# Patient Record
Sex: MALE | Race: WHITE | Employment: OTHER | URBAN - METROPOLITAN AREA
[De-identification: names, ages, dates, MRNs, and addresses within clinical notes are randomized per-mention and may not be internally consistent; named-entity substitution may affect disease eponyms.]

---

## 2017-05-15 ENCOUNTER — HOSPITAL ENCOUNTER (OUTPATIENT)
Dept: GENERAL RADIOLOGY | Age: 72
Discharge: HOME | End: 2017-05-15
Payer: MEDICARE

## 2017-05-15 DIAGNOSIS — M25.531 RIGHT WRIST PAIN: ICD-10-CM

## 2017-05-15 PROCEDURE — 73110 X-RAY EXAM OF WRIST: CPT

## 2017-08-14 PROBLEM — G43.009 MIGRAINE WITHOUT AURA AND WITHOUT STATUS MIGRAINOSUS, NOT INTRACTABLE: Status: ACTIVE | Noted: 2017-08-14

## 2017-08-14 PROBLEM — N40.0 BENIGN PROSTATIC HYPERPLASIA: Status: ACTIVE | Noted: 2017-08-14

## 2017-10-04 ENCOUNTER — HOME HEALTH ADMISSION (OUTPATIENT)
Dept: HOME HEALTH SERVICES | Facility: HOME HEALTH | Age: 72
End: 2017-10-04
Payer: MEDICARE

## 2017-10-05 ENCOUNTER — HOME CARE VISIT (OUTPATIENT)
Dept: SCHEDULING | Facility: HOME HEALTH | Age: 72
End: 2017-10-05
Payer: MEDICARE

## 2017-10-05 VITALS
HEART RATE: 60 BPM | OXYGEN SATURATION: 95 % | TEMPERATURE: 97.5 F | RESPIRATION RATE: 20 BRPM | SYSTOLIC BLOOD PRESSURE: 140 MMHG | DIASTOLIC BLOOD PRESSURE: 90 MMHG

## 2017-10-05 PROCEDURE — 3331090002 HH PPS REVENUE DEBIT

## 2017-10-05 PROCEDURE — 400013 HH SOC

## 2017-10-05 PROCEDURE — 3331090001 HH PPS REVENUE CREDIT

## 2017-10-05 PROCEDURE — G0151 HHCP-SERV OF PT,EA 15 MIN: HCPCS

## 2017-10-06 PROCEDURE — 3331090002 HH PPS REVENUE DEBIT

## 2017-10-06 PROCEDURE — 3331090001 HH PPS REVENUE CREDIT

## 2017-10-07 PROCEDURE — 3331090002 HH PPS REVENUE DEBIT

## 2017-10-07 PROCEDURE — 3331090001 HH PPS REVENUE CREDIT

## 2017-10-08 PROCEDURE — 3331090002 HH PPS REVENUE DEBIT

## 2017-10-08 PROCEDURE — 3331090001 HH PPS REVENUE CREDIT

## 2017-10-09 ENCOUNTER — HOME CARE VISIT (OUTPATIENT)
Dept: HOME HEALTH SERVICES | Facility: HOME HEALTH | Age: 72
End: 2017-10-09
Payer: MEDICARE

## 2017-10-09 PROCEDURE — 3331090001 HH PPS REVENUE CREDIT

## 2017-10-09 PROCEDURE — 3331090002 HH PPS REVENUE DEBIT

## 2017-10-10 ENCOUNTER — HOME CARE VISIT (OUTPATIENT)
Dept: SCHEDULING | Facility: HOME HEALTH | Age: 72
End: 2017-10-10
Payer: MEDICARE

## 2017-10-10 VITALS — RESPIRATION RATE: 18 BRPM | SYSTOLIC BLOOD PRESSURE: 124 MMHG | DIASTOLIC BLOOD PRESSURE: 62 MMHG | HEART RATE: 73 BPM

## 2017-10-10 PROCEDURE — 3331090002 HH PPS REVENUE DEBIT

## 2017-10-10 PROCEDURE — G0157 HHC PT ASSISTANT EA 15: HCPCS

## 2017-10-10 PROCEDURE — 3331090001 HH PPS REVENUE CREDIT

## 2017-10-11 ENCOUNTER — HOME CARE VISIT (OUTPATIENT)
Dept: SCHEDULING | Facility: HOME HEALTH | Age: 72
End: 2017-10-11
Payer: MEDICARE

## 2017-10-11 VITALS
TEMPERATURE: 97.3 F | RESPIRATION RATE: 16 BRPM | SYSTOLIC BLOOD PRESSURE: 124 MMHG | DIASTOLIC BLOOD PRESSURE: 80 MMHG | HEART RATE: 68 BPM

## 2017-10-11 VITALS
TEMPERATURE: 97.3 F | OXYGEN SATURATION: 94 % | HEART RATE: 60 BPM | RESPIRATION RATE: 18 BRPM | DIASTOLIC BLOOD PRESSURE: 88 MMHG | SYSTOLIC BLOOD PRESSURE: 118 MMHG

## 2017-10-11 PROCEDURE — 3331090002 HH PPS REVENUE DEBIT

## 2017-10-11 PROCEDURE — G0152 HHCP-SERV OF OT,EA 15 MIN: HCPCS

## 2017-10-11 PROCEDURE — G0156 HHCP-SVS OF AIDE,EA 15 MIN: HCPCS

## 2017-10-11 PROCEDURE — 3331090001 HH PPS REVENUE CREDIT

## 2017-10-12 ENCOUNTER — HOME CARE VISIT (OUTPATIENT)
Dept: SCHEDULING | Facility: HOME HEALTH | Age: 72
End: 2017-10-12
Payer: MEDICARE

## 2017-10-12 VITALS — DIASTOLIC BLOOD PRESSURE: 76 MMHG | HEART RATE: 69 BPM | SYSTOLIC BLOOD PRESSURE: 128 MMHG | RESPIRATION RATE: 18 BRPM

## 2017-10-12 PROCEDURE — 3331090002 HH PPS REVENUE DEBIT

## 2017-10-12 PROCEDURE — 3331090001 HH PPS REVENUE CREDIT

## 2017-10-12 PROCEDURE — G0157 HHC PT ASSISTANT EA 15: HCPCS

## 2017-10-13 PROCEDURE — 3331090002 HH PPS REVENUE DEBIT

## 2017-10-13 PROCEDURE — 3331090001 HH PPS REVENUE CREDIT

## 2017-10-14 PROCEDURE — 3331090002 HH PPS REVENUE DEBIT

## 2017-10-14 PROCEDURE — 3331090001 HH PPS REVENUE CREDIT

## 2017-10-15 PROCEDURE — 3331090002 HH PPS REVENUE DEBIT

## 2017-10-15 PROCEDURE — 3331090001 HH PPS REVENUE CREDIT

## 2017-10-16 ENCOUNTER — HOME CARE VISIT (OUTPATIENT)
Dept: SCHEDULING | Facility: HOME HEALTH | Age: 72
End: 2017-10-16
Payer: MEDICARE

## 2017-10-16 ENCOUNTER — HOME CARE VISIT (OUTPATIENT)
Dept: HOME HEALTH SERVICES | Facility: HOME HEALTH | Age: 72
End: 2017-10-16
Payer: MEDICARE

## 2017-10-16 PROCEDURE — G0156 HHCP-SVS OF AIDE,EA 15 MIN: HCPCS

## 2017-10-16 PROCEDURE — 3331090001 HH PPS REVENUE CREDIT

## 2017-10-16 PROCEDURE — 3331090002 HH PPS REVENUE DEBIT

## 2017-10-17 ENCOUNTER — HOME CARE VISIT (OUTPATIENT)
Dept: SCHEDULING | Facility: HOME HEALTH | Age: 72
End: 2017-10-17
Payer: MEDICARE

## 2017-10-17 VITALS — SYSTOLIC BLOOD PRESSURE: 134 MMHG | DIASTOLIC BLOOD PRESSURE: 86 MMHG | RESPIRATION RATE: 18 BRPM | HEART RATE: 65 BPM

## 2017-10-17 VITALS — SYSTOLIC BLOOD PRESSURE: 122 MMHG | HEART RATE: 72 BPM | DIASTOLIC BLOOD PRESSURE: 80 MMHG | TEMPERATURE: 98.1 F

## 2017-10-17 PROCEDURE — 3331090001 HH PPS REVENUE CREDIT

## 2017-10-17 PROCEDURE — G0155 HHCP-SVS OF CSW,EA 15 MIN: HCPCS

## 2017-10-17 PROCEDURE — 3331090002 HH PPS REVENUE DEBIT

## 2017-10-17 PROCEDURE — G0157 HHC PT ASSISTANT EA 15: HCPCS

## 2017-10-18 PROCEDURE — 3331090001 HH PPS REVENUE CREDIT

## 2017-10-18 PROCEDURE — 3331090002 HH PPS REVENUE DEBIT

## 2017-10-19 ENCOUNTER — HOME CARE VISIT (OUTPATIENT)
Dept: SCHEDULING | Facility: HOME HEALTH | Age: 72
End: 2017-10-19
Payer: MEDICARE

## 2017-10-19 VITALS
DIASTOLIC BLOOD PRESSURE: 82 MMHG | SYSTOLIC BLOOD PRESSURE: 128 MMHG | HEART RATE: 60 BPM | TEMPERATURE: 96.6 F | RESPIRATION RATE: 18 BRPM

## 2017-10-19 PROCEDURE — 3331090001 HH PPS REVENUE CREDIT

## 2017-10-19 PROCEDURE — G0157 HHC PT ASSISTANT EA 15: HCPCS

## 2017-10-19 PROCEDURE — 3331090002 HH PPS REVENUE DEBIT

## 2017-10-20 PROCEDURE — 3331090002 HH PPS REVENUE DEBIT

## 2017-10-20 PROCEDURE — 3331090001 HH PPS REVENUE CREDIT

## 2017-10-21 PROCEDURE — 3331090002 HH PPS REVENUE DEBIT

## 2017-10-21 PROCEDURE — 3331090001 HH PPS REVENUE CREDIT

## 2017-10-22 PROCEDURE — 3331090001 HH PPS REVENUE CREDIT

## 2017-10-22 PROCEDURE — 3331090002 HH PPS REVENUE DEBIT

## 2017-10-23 ENCOUNTER — HOME CARE VISIT (OUTPATIENT)
Dept: SCHEDULING | Facility: HOME HEALTH | Age: 72
End: 2017-10-23
Payer: MEDICARE

## 2017-10-23 PROCEDURE — 3331090001 HH PPS REVENUE CREDIT

## 2017-10-23 PROCEDURE — 3331090002 HH PPS REVENUE DEBIT

## 2017-10-23 PROCEDURE — G0156 HHCP-SVS OF AIDE,EA 15 MIN: HCPCS

## 2017-10-24 ENCOUNTER — HOME CARE VISIT (OUTPATIENT)
Dept: SCHEDULING | Facility: HOME HEALTH | Age: 72
End: 2017-10-24
Payer: MEDICARE

## 2017-10-24 VITALS
RESPIRATION RATE: 18 BRPM | SYSTOLIC BLOOD PRESSURE: 132 MMHG | TEMPERATURE: 96.6 F | HEART RATE: 65 BPM | DIASTOLIC BLOOD PRESSURE: 82 MMHG

## 2017-10-24 PROCEDURE — G0157 HHC PT ASSISTANT EA 15: HCPCS

## 2017-10-24 PROCEDURE — 3331090001 HH PPS REVENUE CREDIT

## 2017-10-24 PROCEDURE — 3331090002 HH PPS REVENUE DEBIT

## 2017-10-25 PROCEDURE — 3331090002 HH PPS REVENUE DEBIT

## 2017-10-25 PROCEDURE — 3331090001 HH PPS REVENUE CREDIT

## 2017-10-26 ENCOUNTER — HOME CARE VISIT (OUTPATIENT)
Dept: SCHEDULING | Facility: HOME HEALTH | Age: 72
End: 2017-10-26
Payer: MEDICARE

## 2017-10-26 VITALS
DIASTOLIC BLOOD PRESSURE: 80 MMHG | TEMPERATURE: 96.9 F | HEART RATE: 65 BPM | RESPIRATION RATE: 18 BRPM | SYSTOLIC BLOOD PRESSURE: 142 MMHG

## 2017-10-26 PROCEDURE — 3331090001 HH PPS REVENUE CREDIT

## 2017-10-26 PROCEDURE — G0157 HHC PT ASSISTANT EA 15: HCPCS

## 2017-10-26 PROCEDURE — 3331090002 HH PPS REVENUE DEBIT

## 2017-10-27 PROCEDURE — 3331090002 HH PPS REVENUE DEBIT

## 2017-10-27 PROCEDURE — 3331090001 HH PPS REVENUE CREDIT

## 2017-10-28 PROCEDURE — 3331090002 HH PPS REVENUE DEBIT

## 2017-10-28 PROCEDURE — 3331090001 HH PPS REVENUE CREDIT

## 2017-10-29 VITALS
TEMPERATURE: 97.7 F | DIASTOLIC BLOOD PRESSURE: 84 MMHG | RESPIRATION RATE: 20 BRPM | HEART RATE: 70 BPM | SYSTOLIC BLOOD PRESSURE: 124 MMHG

## 2017-10-29 PROCEDURE — 3331090002 HH PPS REVENUE DEBIT

## 2017-10-29 PROCEDURE — 3331090001 HH PPS REVENUE CREDIT

## 2017-10-30 PROCEDURE — 3331090001 HH PPS REVENUE CREDIT

## 2017-10-30 PROCEDURE — 3331090002 HH PPS REVENUE DEBIT

## 2017-10-31 PROCEDURE — 3331090001 HH PPS REVENUE CREDIT

## 2017-10-31 PROCEDURE — 3331090002 HH PPS REVENUE DEBIT

## 2017-11-01 ENCOUNTER — HOME CARE VISIT (OUTPATIENT)
Dept: HOME HEALTH SERVICES | Facility: HOME HEALTH | Age: 72
End: 2017-11-01
Payer: MEDICARE

## 2017-11-01 ENCOUNTER — HOME CARE VISIT (OUTPATIENT)
Dept: SCHEDULING | Facility: HOME HEALTH | Age: 72
End: 2017-11-01
Payer: MEDICARE

## 2017-11-01 VITALS
OXYGEN SATURATION: 97 % | HEART RATE: 73 BPM | DIASTOLIC BLOOD PRESSURE: 84 MMHG | SYSTOLIC BLOOD PRESSURE: 130 MMHG | RESPIRATION RATE: 17 BRPM

## 2017-11-01 VITALS
SYSTOLIC BLOOD PRESSURE: 144 MMHG | RESPIRATION RATE: 20 BRPM | TEMPERATURE: 97.8 F | HEART RATE: 68 BPM | DIASTOLIC BLOOD PRESSURE: 68 MMHG

## 2017-11-01 PROCEDURE — G0156 HHCP-SVS OF AIDE,EA 15 MIN: HCPCS

## 2017-11-01 PROCEDURE — 3331090001 HH PPS REVENUE CREDIT

## 2017-11-01 PROCEDURE — G0151 HHCP-SERV OF PT,EA 15 MIN: HCPCS

## 2017-11-01 PROCEDURE — 3331090002 HH PPS REVENUE DEBIT

## 2017-11-02 PROCEDURE — 3331090002 HH PPS REVENUE DEBIT

## 2017-11-02 PROCEDURE — 3331090001 HH PPS REVENUE CREDIT

## 2017-11-03 PROCEDURE — 3331090002 HH PPS REVENUE DEBIT

## 2017-11-03 PROCEDURE — 3331090001 HH PPS REVENUE CREDIT

## 2017-11-04 PROCEDURE — 3331090002 HH PPS REVENUE DEBIT

## 2017-11-04 PROCEDURE — 3331090001 HH PPS REVENUE CREDIT

## 2017-11-05 PROCEDURE — 3331090001 HH PPS REVENUE CREDIT

## 2017-11-05 PROCEDURE — 3331090002 HH PPS REVENUE DEBIT

## 2017-11-06 ENCOUNTER — HOME CARE VISIT (OUTPATIENT)
Dept: HOME HEALTH SERVICES | Facility: HOME HEALTH | Age: 72
End: 2017-11-06
Payer: MEDICARE

## 2017-11-06 PROCEDURE — 3331090002 HH PPS REVENUE DEBIT

## 2017-11-06 PROCEDURE — 3331090001 HH PPS REVENUE CREDIT

## 2017-11-07 ENCOUNTER — HOME CARE VISIT (OUTPATIENT)
Dept: SCHEDULING | Facility: HOME HEALTH | Age: 72
End: 2017-11-07
Payer: MEDICARE

## 2017-11-07 VITALS
TEMPERATURE: 96.6 F | RESPIRATION RATE: 18 BRPM | DIASTOLIC BLOOD PRESSURE: 70 MMHG | SYSTOLIC BLOOD PRESSURE: 132 MMHG | HEART RATE: 68 BPM

## 2017-11-07 PROCEDURE — 3331090001 HH PPS REVENUE CREDIT

## 2017-11-07 PROCEDURE — G0157 HHC PT ASSISTANT EA 15: HCPCS

## 2017-11-07 PROCEDURE — G0156 HHCP-SVS OF AIDE,EA 15 MIN: HCPCS

## 2017-11-07 PROCEDURE — 3331090002 HH PPS REVENUE DEBIT

## 2017-11-08 ENCOUNTER — HOME CARE VISIT (OUTPATIENT)
Dept: SCHEDULING | Facility: HOME HEALTH | Age: 72
End: 2017-11-08
Payer: MEDICARE

## 2017-11-08 VITALS
SYSTOLIC BLOOD PRESSURE: 122 MMHG | HEART RATE: 61 BPM | RESPIRATION RATE: 18 BRPM | TEMPERATURE: 96 F | DIASTOLIC BLOOD PRESSURE: 70 MMHG

## 2017-11-08 PROCEDURE — 3331090002 HH PPS REVENUE DEBIT

## 2017-11-08 PROCEDURE — 3331090001 HH PPS REVENUE CREDIT

## 2017-11-08 PROCEDURE — G0157 HHC PT ASSISTANT EA 15: HCPCS

## 2017-11-09 PROCEDURE — 3331090001 HH PPS REVENUE CREDIT

## 2017-11-09 PROCEDURE — 3331090002 HH PPS REVENUE DEBIT

## 2017-11-10 PROCEDURE — 3331090001 HH PPS REVENUE CREDIT

## 2017-11-10 PROCEDURE — 3331090002 HH PPS REVENUE DEBIT

## 2017-11-11 PROCEDURE — 3331090002 HH PPS REVENUE DEBIT

## 2017-11-11 PROCEDURE — 3331090001 HH PPS REVENUE CREDIT

## 2017-11-12 PROCEDURE — 3331090002 HH PPS REVENUE DEBIT

## 2017-11-12 PROCEDURE — 3331090001 HH PPS REVENUE CREDIT

## 2017-11-13 PROCEDURE — 3331090002 HH PPS REVENUE DEBIT

## 2017-11-13 PROCEDURE — 3331090001 HH PPS REVENUE CREDIT

## 2017-11-14 ENCOUNTER — HOME CARE VISIT (OUTPATIENT)
Dept: SCHEDULING | Facility: HOME HEALTH | Age: 72
End: 2017-11-14
Payer: MEDICARE

## 2017-11-14 VITALS
HEART RATE: 62 BPM | TEMPERATURE: 96.5 F | RESPIRATION RATE: 18 BRPM | SYSTOLIC BLOOD PRESSURE: 130 MMHG | DIASTOLIC BLOOD PRESSURE: 78 MMHG

## 2017-11-14 PROCEDURE — 3331090002 HH PPS REVENUE DEBIT

## 2017-11-14 PROCEDURE — G0157 HHC PT ASSISTANT EA 15: HCPCS

## 2017-11-14 PROCEDURE — 3331090001 HH PPS REVENUE CREDIT

## 2017-11-15 ENCOUNTER — HOME CARE VISIT (OUTPATIENT)
Dept: SCHEDULING | Facility: HOME HEALTH | Age: 72
End: 2017-11-15
Payer: MEDICARE

## 2017-11-15 PROCEDURE — 3331090001 HH PPS REVENUE CREDIT

## 2017-11-15 PROCEDURE — G0156 HHCP-SVS OF AIDE,EA 15 MIN: HCPCS

## 2017-11-15 PROCEDURE — 3331090002 HH PPS REVENUE DEBIT

## 2017-11-16 ENCOUNTER — HOME CARE VISIT (OUTPATIENT)
Dept: SCHEDULING | Facility: HOME HEALTH | Age: 72
End: 2017-11-16
Payer: MEDICARE

## 2017-11-16 VITALS
DIASTOLIC BLOOD PRESSURE: 78 MMHG | TEMPERATURE: 96.7 F | RESPIRATION RATE: 18 BRPM | HEART RATE: 64 BPM | SYSTOLIC BLOOD PRESSURE: 130 MMHG

## 2017-11-16 VITALS
RESPIRATION RATE: 18 BRPM | DIASTOLIC BLOOD PRESSURE: 82 MMHG | HEART RATE: 64 BPM | SYSTOLIC BLOOD PRESSURE: 136 MMHG | TEMPERATURE: 96.5 F

## 2017-11-16 PROCEDURE — G0157 HHC PT ASSISTANT EA 15: HCPCS

## 2017-11-16 PROCEDURE — 3331090001 HH PPS REVENUE CREDIT

## 2017-11-16 PROCEDURE — 3331090002 HH PPS REVENUE DEBIT

## 2017-11-17 PROCEDURE — 3331090001 HH PPS REVENUE CREDIT

## 2017-11-17 PROCEDURE — 3331090002 HH PPS REVENUE DEBIT

## 2017-11-18 PROCEDURE — 3331090001 HH PPS REVENUE CREDIT

## 2017-11-18 PROCEDURE — 3331090002 HH PPS REVENUE DEBIT

## 2017-11-19 PROCEDURE — 3331090002 HH PPS REVENUE DEBIT

## 2017-11-19 PROCEDURE — 3331090001 HH PPS REVENUE CREDIT

## 2017-11-20 ENCOUNTER — HOME CARE VISIT (OUTPATIENT)
Dept: SCHEDULING | Facility: HOME HEALTH | Age: 72
End: 2017-11-20
Payer: MEDICARE

## 2017-11-20 PROCEDURE — 3331090001 HH PPS REVENUE CREDIT

## 2017-11-20 PROCEDURE — 3331090002 HH PPS REVENUE DEBIT

## 2017-11-20 PROCEDURE — G0156 HHCP-SVS OF AIDE,EA 15 MIN: HCPCS

## 2017-11-21 ENCOUNTER — HOME CARE VISIT (OUTPATIENT)
Dept: SCHEDULING | Facility: HOME HEALTH | Age: 72
End: 2017-11-21
Payer: MEDICARE

## 2017-11-21 VITALS
RESPIRATION RATE: 19 BRPM | DIASTOLIC BLOOD PRESSURE: 78 MMHG | SYSTOLIC BLOOD PRESSURE: 130 MMHG | HEART RATE: 76 BPM | TEMPERATURE: 97 F

## 2017-11-21 VITALS
HEART RATE: 68 BPM | RESPIRATION RATE: 18 BRPM | DIASTOLIC BLOOD PRESSURE: 70 MMHG | TEMPERATURE: 96.6 F | SYSTOLIC BLOOD PRESSURE: 132 MMHG

## 2017-11-21 VITALS
HEART RATE: 64 BPM | TEMPERATURE: 96.7 F | SYSTOLIC BLOOD PRESSURE: 112 MMHG | RESPIRATION RATE: 18 BRPM | DIASTOLIC BLOOD PRESSURE: 78 MMHG

## 2017-11-21 PROCEDURE — 3331090001 HH PPS REVENUE CREDIT

## 2017-11-21 PROCEDURE — 3331090002 HH PPS REVENUE DEBIT

## 2017-11-21 PROCEDURE — G0157 HHC PT ASSISTANT EA 15: HCPCS

## 2017-11-22 PROCEDURE — 3331090002 HH PPS REVENUE DEBIT

## 2017-11-22 PROCEDURE — 3331090001 HH PPS REVENUE CREDIT

## 2017-11-23 PROCEDURE — 3331090002 HH PPS REVENUE DEBIT

## 2017-11-23 PROCEDURE — 3331090001 HH PPS REVENUE CREDIT

## 2017-11-24 PROCEDURE — 3331090001 HH PPS REVENUE CREDIT

## 2017-11-24 PROCEDURE — 3331090002 HH PPS REVENUE DEBIT

## 2017-11-25 PROCEDURE — 3331090002 HH PPS REVENUE DEBIT

## 2017-11-25 PROCEDURE — 3331090001 HH PPS REVENUE CREDIT

## 2017-11-26 PROCEDURE — 3331090001 HH PPS REVENUE CREDIT

## 2017-11-26 PROCEDURE — 3331090002 HH PPS REVENUE DEBIT

## 2017-11-27 ENCOUNTER — HOME CARE VISIT (OUTPATIENT)
Dept: SCHEDULING | Facility: HOME HEALTH | Age: 72
End: 2017-11-27
Payer: MEDICARE

## 2017-11-27 PROCEDURE — 3331090002 HH PPS REVENUE DEBIT

## 2017-11-27 PROCEDURE — 3331090001 HH PPS REVENUE CREDIT

## 2017-11-27 PROCEDURE — G0156 HHCP-SVS OF AIDE,EA 15 MIN: HCPCS

## 2017-11-28 VITALS
HEART RATE: 72 BPM | TEMPERATURE: 97 F | RESPIRATION RATE: 20 BRPM | SYSTOLIC BLOOD PRESSURE: 122 MMHG | DIASTOLIC BLOOD PRESSURE: 80 MMHG

## 2017-11-28 PROCEDURE — 3331090001 HH PPS REVENUE CREDIT

## 2017-11-28 PROCEDURE — 3331090002 HH PPS REVENUE DEBIT

## 2017-11-29 ENCOUNTER — HOME CARE VISIT (OUTPATIENT)
Dept: SCHEDULING | Facility: HOME HEALTH | Age: 72
End: 2017-11-29
Payer: MEDICARE

## 2017-11-29 PROCEDURE — G0151 HHCP-SERV OF PT,EA 15 MIN: HCPCS

## 2017-11-29 PROCEDURE — 3331090001 HH PPS REVENUE CREDIT

## 2017-11-29 PROCEDURE — 3331090002 HH PPS REVENUE DEBIT

## 2017-11-29 PROCEDURE — 3331090003 HH PPS REVENUE ADJ

## 2017-11-30 VITALS
DIASTOLIC BLOOD PRESSURE: 82 MMHG | HEART RATE: 73 BPM | SYSTOLIC BLOOD PRESSURE: 136 MMHG | OXYGEN SATURATION: 19 % | RESPIRATION RATE: 17 BRPM

## 2017-11-30 PROCEDURE — 3331090001 HH PPS REVENUE CREDIT

## 2017-11-30 PROCEDURE — 3331090002 HH PPS REVENUE DEBIT

## 2017-12-01 PROCEDURE — 3331090001 HH PPS REVENUE CREDIT

## 2017-12-01 PROCEDURE — 3331090002 HH PPS REVENUE DEBIT

## 2017-12-02 PROCEDURE — 3331090001 HH PPS REVENUE CREDIT

## 2017-12-02 PROCEDURE — 3331090002 HH PPS REVENUE DEBIT

## 2017-12-03 PROCEDURE — 3331090001 HH PPS REVENUE CREDIT

## 2017-12-03 PROCEDURE — 3331090002 HH PPS REVENUE DEBIT

## 2018-08-14 PROBLEM — E11.40 TYPE 2 DIABETES MELLITUS WITH DIABETIC NEUROPATHY (HCC): Status: ACTIVE | Noted: 2018-08-14

## 2019-01-01 ENCOUNTER — TELEPHONE (OUTPATIENT)
Dept: CASE MANAGEMENT | Age: 74
End: 2019-01-01

## 2019-01-01 ENCOUNTER — HOSPITAL ENCOUNTER (OUTPATIENT)
Dept: NUCLEAR MEDICINE | Age: 74
Discharge: HOME | End: 2019-03-15
Attending: RADIOLOGY
Payer: MEDICARE

## 2019-01-01 ENCOUNTER — HOSPITAL ENCOUNTER (OUTPATIENT)
Dept: CT IMAGING | Age: 74
Discharge: HOME | End: 2019-03-15
Attending: RADIOLOGY
Payer: MEDICARE

## 2019-01-01 ENCOUNTER — HOSPITAL ENCOUNTER (OUTPATIENT)
Dept: RADIATION ONCOLOGY | Age: 74
Discharge: HOME | End: 2019-02-22
Payer: MEDICARE

## 2019-01-01 ENCOUNTER — HOSPITAL ENCOUNTER (OUTPATIENT)
Dept: CT IMAGING | Age: 74
End: 2019-01-01
Attending: RADIOLOGY
Payer: MEDICARE

## 2019-01-01 ENCOUNTER — HOSPITAL ENCOUNTER (OUTPATIENT)
Dept: LAB | Age: 74
Discharge: HOME | End: 2019-01-16
Payer: MEDICARE

## 2019-01-01 ENCOUNTER — HOSPITAL ENCOUNTER (OUTPATIENT)
Dept: RADIATION ONCOLOGY | Age: 74
Discharge: HOME | End: 2019-03-21
Payer: MEDICARE

## 2019-01-01 ENCOUNTER — HOSPITAL ENCOUNTER (OUTPATIENT)
Dept: RADIATION ONCOLOGY | Age: 74
Discharge: HOME | End: 2019-09-26
Payer: MEDICARE

## 2019-01-01 ENCOUNTER — HOSPITAL ENCOUNTER (OUTPATIENT)
Dept: LAB | Age: 74
Discharge: HOME | End: 2019-01-16

## 2019-01-01 ENCOUNTER — HOSPITAL ENCOUNTER (OUTPATIENT)
Dept: RADIATION ONCOLOGY | Age: 74
Discharge: HOME | End: 2019-09-19
Payer: MEDICARE

## 2019-01-01 VITALS
OXYGEN SATURATION: 98 % | HEART RATE: 60 BPM | SYSTOLIC BLOOD PRESSURE: 128 MMHG | DIASTOLIC BLOOD PRESSURE: 79 MMHG | TEMPERATURE: 98.1 F

## 2019-01-01 VITALS
RESPIRATION RATE: 18 BRPM | BODY MASS INDEX: 41.61 KG/M2 | HEART RATE: 65 BPM | WEIGHT: 306.8 LBS | TEMPERATURE: 98.5 F | OXYGEN SATURATION: 95 % | DIASTOLIC BLOOD PRESSURE: 68 MMHG | SYSTOLIC BLOOD PRESSURE: 119 MMHG

## 2019-01-01 VITALS
SYSTOLIC BLOOD PRESSURE: 96 MMHG | RESPIRATION RATE: 16 BRPM | TEMPERATURE: 98.1 F | HEART RATE: 63 BPM | OXYGEN SATURATION: 96 % | DIASTOLIC BLOOD PRESSURE: 64 MMHG

## 2019-01-01 DIAGNOSIS — C61 PROSTATE CANCER (HCC): Primary | ICD-10-CM

## 2019-01-01 DIAGNOSIS — C61 PROSTATE CANCER (HCC): ICD-10-CM

## 2019-01-01 DIAGNOSIS — C06.9 ORAL CANCER (HCC): Primary | ICD-10-CM

## 2019-01-01 LAB
CREAT BLD-MCNC: 0.9 MG/DL (ref 0.8–1.5)
PSA SERPL-MCNC: 17.7 NG/ML

## 2019-01-01 PROCEDURE — 99211 OFF/OP EST MAY X REQ PHY/QHP: CPT

## 2019-01-01 PROCEDURE — 78306 BONE IMAGING WHOLE BODY: CPT

## 2019-01-01 PROCEDURE — 88305 TISSUE EXAM BY PATHOLOGIST: CPT

## 2019-01-01 PROCEDURE — 36415 COLL VENOUS BLD VENIPUNCTURE: CPT

## 2019-01-01 PROCEDURE — 82565 ASSAY OF CREATININE: CPT

## 2019-01-01 PROCEDURE — 74177 CT ABD & PELVIS W/CONTRAST: CPT

## 2019-01-01 PROCEDURE — 74011636320 HC RX REV CODE- 636/320: Performed by: RADIOLOGY

## 2019-01-01 PROCEDURE — 74011000258 HC RX REV CODE- 258: Performed by: RADIOLOGY

## 2019-01-01 PROCEDURE — 88344 IMHCHEM/IMCYTCHM EA MLT ANTB: CPT

## 2019-01-01 PROCEDURE — 84153 ASSAY OF PSA TOTAL: CPT

## 2019-01-01 RX ORDER — SODIUM CHLORIDE 0.9 % (FLUSH) 0.9 %
10 SYRINGE (ML) INJECTION
Status: COMPLETED | OUTPATIENT
Start: 2019-01-01 | End: 2019-01-01

## 2019-01-01 RX ADMIN — DIATRIZOATE MEGLUMINE AND DIATRIZOATE SODIUM 15 ML: 660; 100 LIQUID ORAL; RECTAL at 10:45

## 2019-01-01 RX ADMIN — SODIUM CHLORIDE 100 ML: 9 INJECTION, SOLUTION INTRAVENOUS at 10:45

## 2019-01-01 RX ADMIN — Medication 10 ML: at 07:32

## 2019-01-01 RX ADMIN — Medication 10 ML: at 10:45

## 2019-01-01 RX ADMIN — IOPAMIDOL 100 ML: 755 INJECTION, SOLUTION INTRAVENOUS at 10:45

## 2019-02-22 NOTE — PROGRESS NOTES
Patient: Grabiel Guzman MRN: [id number]  SSN: [id number] YOB: 1945  Age: 68 y.o. Sex: male Other Providers:  Adam William MD 
 
CHIEF COMPLAINT: Prostate Cancer DIAGNOSIS: Favorable intermediate risk prostate adenocarcinoma. GS 4+3=7, PSA 11.9 (Density 0.37) PREVIOUS TREATMENT: 
1)  TRUS Biopsy 1/16/19 HISTORY OF PRESENT ILLNESS:  Grabiel Guzman is a 68 y.o. male who I am seeing at the request of Dr. Olga Epsinoza for prostate cancer. He was seen and evaluated for an elevated PSA on routine screening. His pertinent past medical history includes obesity , asthma, A-Fib, DMII, HTN, depression, PUD, and sleep apnea. He does have a pacemaker. With his spinal stenosis and arthritis, he is non-ambulatory with an electric wheel chair. There was no family history of prostate cancer. PSA History: PSA 11.9 November 2018 PSA 7.1 June 2016 PSA 5.8 October 2015 PSA 5.3 March 2015 Biopsy Results:  1/16/19 - 32 cc gland. 3 cores total.   
[place] 4+3 <5% right apex. [place] 3+4 equal 7 disease with 50% involvement of one out of 3 cores left apex (<5% GS 4). [place] 6 disease is noted in 2 out of 3 cores from the left mid with 50 and 5% involvement respectively. High-grade PIM is noted at the left base. He was ultimately seen back in the urology office to discuss the results of the biopsy and management options. Both radiation and surgical options were discussed and he has been referred to me to further discuss radiation as an option for management of his prostate cancer. Per Dr. Olga Espinoza, he did not feel he was an ideal surgical candidate but did feel radiation would be a good option for him. GENITOURINARY REVIEW OF SYSTEMS:  An EPIC 26 was completed pretreatment. His AUA score was 4. PAST MEDICAL HISTORY:   
Past Medical History:  
Diagnosis Date  Arrhythmia   
 afib  Arthritis  Asthma 2/4/2015  Atrial fibrillation ([facility] [address]  Atrial flutter ([facility] [address] 12/11/2015  Backache, unspecified 11/12/2014  Chest pain 04/2009  Chronic pain SPINAL STENOSIS  Chronic pain LEFT SHOULDER TENDONITIS  Chronic pain   
 neck pain  Costochondritis 12/11/2015  Diabetes ([facility] [address] TYPE 2  
 Edema 11/12/2014  Encounter for long-term (current) use of other medications 11/12/2014  Hypertension 04/2009  
 essential  
 Morbid obesity ([facility] [address]  OA (osteoarthritis)  Other ill-defined conditions(799.89) MORBID OBESITY  Other ill-defined conditions(799.89)   
 sleep apnea  Pacemaker  Personal history of fall 11/12/2014  Psychiatric disorder   
 depression  PUD (peptic ulcer disease)   
 hx ulcer in 1983  Sleep apnea 12/11/2015  Type II or unspecified type diabetes mellitus without mention of complication, not stated as uncontrolled 11/12/2014  Unspecified sleep apnea   
 cpap The patient denies history of collagen vascular diseases, prior radiation or prior chemotherapy. He does have a pacemaker. PAST SURGICAL HISTORY:  
Past Surgical History:  
Procedure Laterality Date  HX PACEMAKER  Aug 2015  
 OTHER CELL    
 REMOVAL GANGLION CYST FROM LEFT FOOT  
 OTHER CELL    
 wisdom teeth removal  
 
 
MEDICATIONS:  
 
Current Outpatient Medications:  
  allopurinol (ZYLOPRIM) 300 mg tablet, TAKE 1 TABLET BY MOUTH ONCE DAILY, Disp: 90 Tab, Rfl: 2 
  glimepiride (AMARYL) 2 mg tablet, Take 1 Tab by mouth every morning., Disp: 90 Tab, Rfl: 2 
  furosemide (LASIX) 40 mg tablet, Take  by mouth daily. , Disp: , Rfl:  
  carvedilol (COREG) 6.25 mg tablet, Take 1 Tab by mouth two (2) times daily (with meals). , Disp: 180 Tab, Rfl: 3 
  montelukast (SINGULAIR) 10 mg tablet, TAKE 1 TABLET BY MOUTH ONCE DAILY, Disp: 90 Tab, Rfl: 2 
  metFORMIN (GLUCOPHAGE) 500 mg tablet, 2 in am and 1 in pm (Patient taking differently: 500 mg two (2) times daily (with meals). ), Disp: 270 Tab, Rfl: 3   butalbital-acetaminophen-caffeine (FIORICET, ESGIC) -40 mg per tablet, TAKE TWO TABLETS BY MOUTH EVERY 4 HOURS, Disp: 180 Tab, Rfl: 1 
  folic acid (FOLVITE) 1 mg tablet, Take 1 Tab by mouth daily. , Disp: 90 Tab, Rfl: 2 
  nabumetone (RELAFEN) 500 mg tablet, Two tablets in am and two tablets in pm, Disp: 360 Tab, Rfl: 3 
  nystatin (MYCOSTATIN) 100,000 unit/mL suspension, SWISH AND SPIT ONE TEASPOONFUL (5ML) BY MOUTH 4 TIMES DAILY, Disp: 120 mL, Rfl: 5 
  gabapentin (NEURONTIN) 600 mg tablet, TAKE ONE TABLET BY MOUTH THREE TIMES DAILY, Disp: 270 Tab, Rfl: 2 
  diclofenac (VOLTAREN) 1 % gel, Apply 4 g to affected area four (4) times daily. , Disp: 300 g, Rfl: 5 
  DULoxetine (CYMBALTA) 60 mg capsule, Take 1 Cap by mouth daily. , Disp: 90 Cap, Rfl: 3 
  nabumetone (RELAFEN) 750 mg tablet, Take 1 Tab by mouth two (2) times a day., Disp: 180 Tab, Rfl: 3   rivaroxaban (XARELTO) 20 mg tab tablet, Take 1 Tab by mouth daily. , Disp: 90 Tab, Rfl: 3 
  nystatin (MYCOSTATIN) powder, Apply  to affected area four (4) times daily. , Disp: 60 g, Rfl: 5 
  fluticasone (FLONASE) 50 mcg/actuation nasal spray, One spray in each nostril twice daily, Disp: 1 Bottle, Rfl: 11 
  albuterol (PROAIR HFA) 90 mcg/actuation inhaler, Take 2 Puffs by inhalation every four (4) hours as needed for Wheezing., Disp: 1 Inhaler, Rfl: 5 
  cpap machine kit, by Does Not Apply route., Disp: , Rfl:  
  polyethylene glycol (MIRALAX) 17 gram packet, Take 17 g by mouth as needed. , Disp: , Rfl: ALLERGIES:  
Allergies Allergen Reactions  Metronidazole Hives SOCIAL HISTORY:  
Social History Socioeconomic History  Marital status:  Spouse name: Not on file  Number of children: Not on file  Years of education: Not on file  Highest education level: Not on file Social Needs  Financial resource strain: Not on file  Food insecurity - worry: Not on file  Food insecurity - inability: Not on file  Transportation needs - medical: Not on file  Transportation needs - non-medical: Not on file Occupational History  Not on file Tobacco Use  Smoking status: Never Smoker  Smokeless tobacco: Never Used Substance and Sexual Activity  Alcohol use: No  
  Alcohol/week: 0.0 oz  Drug use: No  
 Sexual activity: Not on file Other Topics Concern  Not on file Social History Narrative  Not on file FAMILY HISTORY:  
Family History Problem Relation Age of Onset  Heart Failure Mother   
     xh chf  
 
 
REVIEW OF SYSTEMS: Please see the completed review of systems sheet in the chart that I have reviewed today. PHYSICAL EXAMINATION:  
ECOG Performance status 3 VITAL SIGNS:  
Visit Vitals BP 96/64 Pulse 63 Temp 98.1 °F (36.7 °C) Resp 16 SpO2 96% GENERAL: The patient is obese and non ambulatory, alert and in no acute distress. He was seenin his electric wheelchair. HEENT: Head is normocephalic, atraumatic. Pupils are equal, round and reactive to light and accommodation. Extraocular movement intact. Hearing is intact bilaterally to finger rub. Oral cavity reveals no lesions. Mucous membranes are moist. NECK: Neck is supple with no masses. CARDIOVASCULAR: Heart is regular rate and rhythm. There are no murmurs rubs or gallups. Radial pulses are 2+ RESPIRATORY: Lungs are clear to auscultation and percussion. There is normal respiratory effort. GASTROINTESTINAL: The abdomen is obese, soft, non-tender, nondistended with no hepatospelnomagaly. Digital rectal examination: patient was very challenged to even get up out of his wheelchair and felt very unstable from exams so this was deferred. Monica Inna LYMPHATIC: There is no cervical, supraclavicular or axillary lymphadenopathy bilaterally. PATHOLOGY:   
Please see HPI for details of TRUS Biopsy. 1/16/19:   
 DIAGNOSIS  
A: \"PROSTATE, LEFT BASE, BIOPSY\":  
HIGH GRADE PROSTATIC INTRAEPITHELIAL NEOPLASIA. B: \"PROSTATE, LEFT MID, BIOPSY\":  
HIGH GRADE PROSTATIC INTRAEPITHELIAL NEOPLASIA WITH ADJACENT ATYPICAL GLANDS SUSPICIOUS FOR ADENOCARCINOMA INVOLVING 1 OF 3 CORES. PROSTATIC ADENOCARCINOMA, RUPRET SCORE 3 + 3 = 6 (GRADE GROUP 1), INVOLVING 2 OF 3 CORES (DISCONTINUOUSLY INVOLVING 50%; LESS THAN 5%). C: \"PROSTATE, LEFT APEX, BIOPSY\":  
PROSTATIC ADENOCARCINOMA, RUPERT SCORE 3 + 4 = 7 (GRADE GROUP 2), INVOLVING 50% OF 1 OF 3 CORES; PERCENTAGE OF GRADE 4 LESS THAN  
5%. PROSTATIC ADENOCARCINOMA, RUPERT SCORE 3 + 3 = 6 (GRADE GROUP 1), INVOLVING 10% OF 1 OF 3 CORES. D: \"PROSTATE, RIGHT BASE, BIOPSY\":  
NO CARCINOMA IDENTIFIED. E: \"PROSTATE, RIGHT MID, BIOPSY\":  
NO CARCINOMA IDENTIFIED. F: \"PROSTATE, RIGHT APEX, BIOPSY\":  
PROSTATIC ADENOCARCINOMA, RUPERT SCORE 4 + 3 = 7 (GRADE GROUP 3), INVOLVING LESS THAN 5% OF MULTIPLE FRAGMENTS. LABORATORY:  
Lab Results Component Value Date/Time Sodium 141 11/08/2018 10:42 AM  
 Potassium 4.3 11/08/2018 10:42 AM  
 Chloride 102 11/08/2018 10:42 AM  
 CO2 22 11/08/2018 10:42 AM  
 Anion gap 6 (L) 05/10/2016 01:02 PM  
 Glucose 92 11/08/2018 10:42 AM  
 BUN 17 11/08/2018 10:42 AM  
 Creatinine 1.04 11/08/2018 10:42 AM  
 GFR est AA 83 11/08/2018 10:42 AM  
 GFR est non-AA 71 11/08/2018 10:42 AM  
 Calcium 9.2 11/08/2018 10:42 AM  
 Magnesium 1.5 (L) 07/14/2015 11:33 AM  
 Albumin 4.0 11/08/2018 10:42 AM  
 Protein, total 7.1 11/08/2018 10:42 AM  
 Globulin 4.2 (H) 05/10/2016 01:02 PM  
 A-G Ratio 1.3 11/08/2018 10:42 AM  
 AST (SGOT) 16 11/08/2018 10:42 AM  
 ALT (SGPT) 11 11/08/2018 10:42 AM  
 
Lab Results Component Value Date/Time WBC 7.2 11/08/2018 10:42 AM  
 HGB 15.4 11/08/2018 10:42 AM  
 HCT 46.9 11/08/2018 10:42 AM  
 PLATELET 123 08/53/2483 10:42 AM  
 
 
Please see HPI for PSA History. RADIOLOGY:   
No results found.  
 
IMPRESSION:  Lacho Eararun is a 68 y.o. male with favorable intermediate risk prostate cancer. The natural history of prostate cancer was reviewed with the patient. Prognostic features including stage, Therese score, age, race, and PSA were reviewed. Treatment options for prostate cancer including active surveillance, hormonal therapy, radiotherapy, and surgery were compared and contrasted with regard to outcome and quality of life. I discussed the radiotherapy options including low-dose rate brachytherapy, high-dose-rate brachytherapy, and external beam radiation therapy. We also discussed the addition of hormone therapy. Side effects and complications of radiation therapy including fatigue, urinary obstructive symptoms, rectal irritation and bleeding, and decline of sexual function were among the complications highlighted. He asked many questions which were answered to his satisfaction. For intermediate risk disease, there are several viable treatment options include active surveillance which includes delaying definitive therapy until which time the disease progresses, or the patient is no longer interstitial in this option. At this time surgery with radical prostatectomy, or IMRT alone or in combination with a short course of androgen deprivation therapy (6 months with hormones started 2 months prior to starting the radiation) are appropriate treatment options. I advised the patient each of these options offer similar tumor control with differences found mainly in varying toxicity profiles. IMRT is given as a single modality treatment or as initial treatment prior to an HDR boost.  A total of 45 Gy would be delivered to the pelvis and prostate gland followed by a 15 gray in 1 fraction HDR boost to the prostate gland alone.  For patients who are not candidates for brachytherapy either by poor anatomy meaning substantial arch interference or a prostate gland outside of 20-60 cc where good dosimetry is often not achievable, IMRT is given for the entirety of the treatment course. Furthermore, for low or intermediate risk, there is now substantial data in support of hypo-fractionated radiation. Commonly a dose greater than 2 Gy per fraction, such as 2.5 Gy as given in numerous clinical trials including RTOG 0415, to a total dose of 70 Gy offers similar toxicity outcomes and is found to be non-inferior to standard fractionated radiation and therefore more logistically appealing. SBRT is given as single modality treatment for low risk prostate cancer. There is new and evolving data from the radiation oncology literature and support of what is complained as extreme hypo-fractionation. This generally includes treatment over 3-5 fractions to the prostate gland alone. Current studies have really evaluated only low risk disease with targeting of the prostate gland alone excluding the seminal vesicles and the lymph nodes. This is also generally offered for patients with low AUA scores. While its indications overlap substantially with LDR brachytherapy, it does not have the limitation of prostate gland size between 20 and 60 cc as even smaller glands are easily targetable with this technique. It also offers a substantial advantage of being given as an outpatient without any surgery required for any protracted hospital stay. While we are confident long-term data will be in support of this is a single modality treatment, its major limitation at this point is short follow-up on the studies that have been completed. Nonetheless, it is an excellent option for patients with low risk prostate cancer who are either not candidates for surgery or who wish to avoid surgery. He does have favorable intermediate with less than 50% of his cores positive.   He had a single core with primary component [place] 4, and a secondary core with a secondary component of [place] 4, although only 5% of that sample. With his medical comorbidities, I do feel a course that avoids radiation combined with androgen deprivation would be in his best interest.  In that setting, I feel CyberKnife or hypo-fractionated radiation would be appropriate. We also spent a great deal time discussing active surveillance. Upon specific questioning, he has had somewhat of a health decline over the last 3-5 years and therefore this may very well be a reasonable option which he and his wife will contemplate prior to our next follow-up. We also discussed the new and emerging data on SpaceOAR or Augmenix which is a water based gel placed between the rectum and prostate which has been shown in clinical trials to spare GI toxicity. This is placed at time of fiducial markers. Unfortunately I cannot complete an MRI but will complete a CT of the abdomen and pelvis along with a bone scan and see him back after tumor board presentation in 3 weeks to make final decisions. PLAN:   
1) Discussed viable treatment options focusing on external beam radiation highlighting the risks, benefits, and side effects from treatment. 2) Reviewed available research treatment and cancer care protocols for which patient may be eligible. Unfortunately there are no matching clinical trials available at this time. 3) Further staging to include CT abdomen and bone scan. 4) Patient will be scheduled for follow up after presentation in tumor board and subsequent staging scans before making a treatment decision. Davide Valdez MD  
February 22, 2019

## 2019-02-22 NOTE — PROGRESS NOTES
Pt here today for initial RT consult for prostate cancer with Dr. Murali Kemp. According to Dr. Jyoti Chan pt is not a surgical candidate. His 11/8/19 PSA is 11.9. The AUA/epic is 4/35. An overview of RT was given. Pt is not able to have MRI's due to his pacemaker. Pt will complete a CT AP, a Bone Scan, and then be presented at [facility] [facility]. He will return for RT treatment discussion.

## 2019-03-21 NOTE — NURSE NAVIGATOR
F/u prostate cancer. Pt presented in motorized chair for follow up. Here for treatment discussion. CT A/P and bone scan 3-15-19. Pt cannot have MRI due to device. S/p MDC. Pt presented pathology showing new oral cancer. Pt was referred to Dr. Chucho Anthony by dentist. 
Referral to Dr. Margarita Rosado cancelled.  
 
Pedro Waters RN

## 2019-03-21 NOTE — PROGRESS NOTES
Patient: Andrew Loaiza MRN: [id number]  SSN: [id number] YOB: 1945  Age: 68 y.o. Sex: male Other Providers:  Samia Price MD 
 
CHIEF COMPLAINT: Prostate Cancer DIAGNOSIS: Favorable intermediate risk prostate adenocarcinoma. GS 4+3=7, PSA 11.9 (Density 0.37) PREVIOUS TREATMENT: 
1)  TRUS Biopsy 1/16/19 HISTORY OF PRESENT ILLNESS:  Andrew Loaiza is a 68 y.o. male who I am seeing at the request of Dr. Tereso Stone for prostate cancer back after original consultation 2/22/19. He was seen and evaluated for an elevated PSA on routine screening. His pertinent past medical history includes obesity , asthma, A-Fib, DMII, HTN, depression, PUD, and sleep apnea. He does have a pacemaker. With his spinal stenosis and arthritis, he is non-ambulatory with an electric wheel chair. There was no family history of prostate cancer. PSA History: PSA 11.9 November 2018 PSA 7.1 June 2016 PSA 5.8 October 2015 PSA 5.3 March 2015 Biopsy Results:  1/16/19 - 32 cc gland. 3 cores total.   
Malone 4+3 <5% right apex. Malone 3+4 equal 7 disease with 50% involvement of one out of 3 cores left apex (<5% GS 4). Malone 6 disease is noted in 2 out of 3 cores from the left mid with 50 and 5% involvement respectively. High-grade PIM is noted at the left base. He was ultimately seen back in the urology office to discuss the results of the biopsy and management options. Both radiation and surgical options were discussed and he has been referred to me to further discuss radiation as an option for management of his prostate cancer. Per Dr. Tereso Stone, he did not feel he was an ideal surgical candidate but did feel radiation would be a good option for him. After original discussion we did determine that his functional status had seen somewhat of a PICC line over recent years.   Nonetheless we did proceed with a CT and bone scan which noted no concern for extracapsular spread or distant metastatic disease. He was presented in conference 3/19/2019 and a group agreed with either active surveillance or potentially CyberKnife as reasonable options for management of his disease. Unfortunately between our counters his dentist saw him and biopsied and area that he called buccal mucosa which returned positive for squamous cell carcinoma. This was evident on exam during his follow-up 3/21/2019. GENITOURINARY REVIEW OF SYSTEMS:  An EPIC 26 was completed pretreatment. His AUA score was 4. PAST MEDICAL HISTORY:   
Past Medical History:  
Diagnosis Date  Arrhythmia   
 afib  Arthritis  Asthma 2/4/2015  Atrial fibrillation ([address]  Atrial flutter ([address] 12/11/2015  Backache, unspecified 11/12/2014  Chest pain 04/2009  Chronic pain SPINAL STENOSIS  Chronic pain LEFT SHOULDER TENDONITIS  Chronic pain   
 neck pain  Costochondritis 12/11/2015  Diabetes ([address] TYPE 2  
 Edema 11/12/2014  Encounter for long-term (current) use of other medications 11/12/2014  Hypertension 04/2009  
 essential  
 Morbid obesity ([address]  OA (osteoarthritis)  Other ill-defined conditions(799.89) MORBID OBESITY  Other ill-defined conditions(799.89)   
 sleep apnea  Pacemaker  Personal history of fall 11/12/2014  Psychiatric disorder   
 depression  PUD (peptic ulcer disease)   
 hx ulcer in 1983  Sleep apnea 12/11/2015  Type II or unspecified type diabetes mellitus without mention of complication, not stated as uncontrolled 11/12/2014  Unspecified sleep apnea   
 cpap The patient denies history of collagen vascular diseases, prior radiation or prior chemotherapy. He does have a pacemaker. PAST SURGICAL HISTORY:  
Past Surgical History:  
Procedure Laterality Date  HX PACEMAKER  Aug 2015  
 OTHER CELL    
 REMOVAL GANGLION CYST FROM LEFT FOOT  
 OTHER CELL    
 wisdom teeth removal  
 
 
MEDICATIONS:  
 
Current Outpatient Medications:  
  rivaroxaban (XARELTO) 20 mg tab tablet, Take 1 Tab by mouth daily. , Disp: 90 Tab, Rfl: 3 
  DULoxetine (CYMBALTA) 60 mg capsule, TAKE 1 CAPSULE BY MOUTH ONCE DAILY, Disp: 90 Cap, Rfl: 3 
  allopurinol (ZYLOPRIM) 300 mg tablet, TAKE 1 TABLET BY MOUTH ONCE DAILY, Disp: 90 Tab, Rfl: 2 
  glimepiride (AMARYL) 2 mg tablet, Take 1 Tab by mouth every morning., Disp: 90 Tab, Rfl: 2 
  furosemide (LASIX) 40 mg tablet, Take  by mouth daily. , Disp: , Rfl:  
  carvedilol (COREG) 6.25 mg tablet, Take 1 Tab by mouth two (2) times daily (with meals). , Disp: 180 Tab, Rfl: 3 
  montelukast (SINGULAIR) 10 mg tablet, TAKE 1 TABLET BY MOUTH ONCE DAILY, Disp: 90 Tab, Rfl: 2 
  metFORMIN (GLUCOPHAGE) 500 mg tablet, 2 in am and 1 in pm (Patient taking differently: 500 mg two (2) times daily (with meals). ), Disp: 270 Tab, Rfl: 3 
  butalbital-acetaminophen-caffeine (FIORICET, ESGIC) -40 mg per tablet, TAKE TWO TABLETS BY MOUTH EVERY 4 HOURS, Disp: 180 Tab, Rfl: 1 
  folic acid (FOLVITE) 1 mg tablet, Take 1 Tab by mouth daily. , Disp: 90 Tab, Rfl: 2 
  nabumetone (RELAFEN) 500 mg tablet, Two tablets in am and two tablets in pm, Disp: 360 Tab, Rfl: 3 
  nystatin (MYCOSTATIN) 100,000 unit/mL suspension, SWISH AND SPIT ONE TEASPOONFUL (5ML) BY MOUTH 4 TIMES DAILY, Disp: 120 mL, Rfl: 5 
  gabapentin (NEURONTIN) 600 mg tablet, TAKE ONE TABLET BY MOUTH THREE TIMES DAILY, Disp: 270 Tab, Rfl: 2 
  diclofenac (VOLTAREN) 1 % gel, Apply 4 g to affected area four (4) times daily. , Disp: 300 g, Rfl: 5 
  nabumetone (RELAFEN) 750 mg tablet, Take 1 Tab by mouth two (2) times a day., Disp: 180 Tab, Rfl: 3 
  nystatin (MYCOSTATIN) powder, Apply  to affected area four (4) times daily. , Disp: 60 g, Rfl: 5 
  fluticasone (FLONASE) 50 mcg/actuation nasal spray, One spray in each nostril twice daily, Disp: 1 Bottle, Rfl: 11 
   albuterol (PROAIR HFA) 90 mcg/actuation inhaler, Take 2 Puffs by inhalation every four (4) hours as needed for Wheezing., Disp: 1 Inhaler, Rfl: 5 
  cpap machine kit, by Does Not Apply route., Disp: , Rfl:  
  polyethylene glycol (MIRALAX) 17 gram packet, Take 17 g by mouth as needed. , Disp: , Rfl: ALLERGIES:  
Allergies Allergen Reactions  Metronidazole Hives SOCIAL HISTORY:  
Social History Socioeconomic History  Marital status:  Spouse name: Not on file  Number of children: Not on file  Years of education: Not on file  Highest education level: Not on file Occupational History  Not on file Social Needs  Financial resource strain: Not on file  Food insecurity:  
  Worry: Not on file Inability: Not on file  Transportation needs:  
  Medical: Not on file Non-medical: Not on file Tobacco Use  Smoking status: Never Smoker  Smokeless tobacco: Never Used Substance and Sexual Activity  Alcohol use: No  
  Alcohol/week: 0.0 oz  Drug use: No  
 Sexual activity: Not on file Lifestyle  Physical activity:  
  Days per week: Not on file Minutes per session: Not on file  Stress: Not on file Relationships  Social connections:  
  Talks on phone: Not on file Gets together: Not on file Attends Spiritism service: Not on file Active member of club or organization: Not on file Attends meetings of clubs or organizations: Not on file Relationship status: Not on file  Intimate partner violence:  
  Fear of current or ex partner: Not on file Emotionally abused: Not on file Physically abused: Not on file Forced sexual activity: Not on file Other Topics Concern  Not on file Social History Narrative  Not on file FAMILY HISTORY:  
Family History Problem Relation Age of Onset  Heart Failure Mother   
     xh chf  
 
 
PHYSICAL EXAMINATION:  
ECOG Performance status 3 VITAL SIGNS:  
 Visit Vitals /79 (BP 1 Location: Left arm, BP Patient Position: Sitting) Pulse 60 Temp 98.1 °F (36.7 °C) SpO2 98% GENERAL: The patient is obese and non ambulatory, alert and in no acute distress. He was seenin his electric wheelchair. HEENT:  Picture below there is a fleshy plaque like lesion along the alveolar ridge on the left. dCARDIOVASCULAR: Heart is regular rate and rhythm. There are no murmurs rubs or gallups. Radial pulses are 2+ RESPIRATORY: Lungs are clear to auscultation and percussion. There is normal respiratory effort. PATHOLOGY:   
Please see HPI for details of TRUS Biopsy. 1/16/19:   
 DIAGNOSIS  
A: \"PROSTATE, LEFT BASE, BIOPSY\":  
HIGH GRADE PROSTATIC INTRAEPITHELIAL NEOPLASIA. B: \"PROSTATE, LEFT MID, BIOPSY\":  
HIGH GRADE PROSTATIC INTRAEPITHELIAL NEOPLASIA WITH ADJACENT ATYPICAL GLANDS SUSPICIOUS FOR ADENOCARCINOMA INVOLVING 1 OF 3 CORES. PROSTATIC ADENOCARCINOMA, RUPERT SCORE 3 + 3 = 6 (GRADE GROUP 1), INVOLVING 2 OF 3 CORES (DISCONTINUOUSLY INVOLVING 50%; LESS THAN 5%). C: \"PROSTATE, LEFT APEX, BIOPSY\":  
PROSTATIC ADENOCARCINOMA, RUPERT SCORE 3 + 4 = 7 (GRADE GROUP 2), INVOLVING 50% OF 1 OF 3 CORES; PERCENTAGE OF GRADE 4 LESS THAN  
5%. PROSTATIC ADENOCARCINOMA, RUPERT SCORE 3 + 3 = 6 (GRADE GROUP 1), INVOLVING 10% OF 1 OF 3 CORES. D: \"PROSTATE, RIGHT BASE, BIOPSY\":  
NO CARCINOMA IDENTIFIED. E: \"PROSTATE, RIGHT MID, BIOPSY\":  
NO CARCINOMA IDENTIFIED. F: \"PROSTATE, RIGHT APEX, BIOPSY\":  
PROSTATIC ADENOCARCINOMA, RUPERT SCORE 4 + 3 = 7 (GRADE GROUP 3), INVOLVING LESS THAN 5% OF MULTIPLE FRAGMENTS. LABORATORY:  
Lab Results Component Value Date/Time  Sodium 141 11/08/2018 10:42 AM  
 Potassium 4.3 11/08/2018 10:42 AM  
 Chloride 102 11/08/2018 10:42 AM  
 CO2 22 11/08/2018 10:42 AM  
 Anion gap 6 (L) 05/10/2016 01:02 PM  
 Glucose 92 11/08/2018 10:42 AM  
 BUN 17 11/08/2018 10:42 AM  
 Creatinine 1.04 11/08/2018 10:42 AM  
 GFR est AA 83 11/08/2018 10:42 AM  
 GFR est non-AA 71 11/08/2018 10:42 AM  
 Calcium 9.2 11/08/2018 10:42 AM  
 Magnesium 1.5 (L) 07/14/2015 11:33 AM  
 Albumin 4.0 11/08/2018 10:42 AM  
 Protein, total 7.1 11/08/2018 10:42 AM  
 Globulin 4.2 (H) 05/10/2016 01:02 PM  
 A-G Ratio 1.3 11/08/2018 10:42 AM  
 AST (SGOT) 16 11/08/2018 10:42 AM  
 ALT (SGPT) 11 11/08/2018 10:42 AM  
 
Lab Results Component Value Date/Time WBC 7.2 11/08/2018 10:42 AM  
 HGB 15.4 11/08/2018 10:42 AM  
 HCT 46.9 11/08/2018 10:42 AM  
 PLATELET 535 96/73/6777 10:42 AM  
 
 
Please see HPI for PSA History. RADIOLOGY:   
I have personally reviewed the imaging and agree with the reports below. Nm Bone Scan Wh Body Result Date: 3/15/2019 Nuclear medicine whole body bone scan. CLINICAL INDICATION: Prostate cancer, staging exam PROCEDURE: After appropriate patient preparation, the patient was administered 25.4 mCi of technetium labeled HDP. Standard scintigraphic imaging was performed over the entire skeleton. COMPARISON: CT the abdomen and pelvis with contrast from the same day FINDINGS: There is increased tracer uptake noted along the posterior elements at T11 and L2 which is likely degenerative. Degenerative uptake also noted in the bilateral, left greater than right knees. The somewhat usual appearance of the left kidney is related to the very large renal cyst.  
 
IMPRESSION: 1. No scintigraphic evidence for osseous metastatic disease. 2. Degenerative uptake noted at T11 and L3 3. Large left renal cyst 
 
Ct Abd Pelv W Cont Result Date: 3/15/2019 CT OF THE ABDOMEN AND PELVIS WITH CONTRAST. CLINICAL INDICATION: Prostate cancer, staging exam PROCEDURE: Serial thin section axial images obtained from the lung bases through the proximal femurs following the administration of 100 cc of Isovue 370 intravenous contrast.  Radiation dose reduction techniques were used for this study.  Our CT scanners use one or all of the following: Automated exposure control, adjusted of the mA and/or kV according to patient size, iterative reconstruction COMPARISON: CT the abdomen and pelvis dated 8/11/2016 the lung bases are clear. FINDINGS: CT ABDOMEN: The liver and spleen are unremarkable. The patient is status post cholecystectomy. The pancreas is normal. The kidneys are symmetric in size. Multiple bilateral renal cysts are appreciated. The largest is noted on the left measuring 10 cm. There is no hydronephrosis. The adrenal glands are normal. No retroperitoneal or mesenteric adenopathy. In the midline, there is suggestion of a trocar site from prior abdominal procedure with underlying nodular soft tissue density that has decreased slightly in size along the anterior lower midline abdominal wall most likely representing hypertrophic scarring. It currently measures 4.1 x 2.0 cm. (Previous 4.5 x 1.9 cm). CT PELVIS: The bladder is well-distended with smooth thin walls. No inguinal hernia or adenopathy. The prostate gland has a normal appearance. No free fluid appreciated dependently in the pelvis. No aggressive osseous lesions identified. IMPRESSION: 1. No acute abdominal abnormality or findings to suspect metastatic disease. 2. Nodular soft tissue density along the anterior lower midline abdominal wall with a contiguous area of linear scarring most in keeping with hypertrophic scar tissue. 3. Bilateral numerous renal cysts. IMPRESSION:  Manan Perez is a 68 y.o. male with favorable intermediate risk prostate cancer. The natural history of prostate cancer was again reviewed with the patient. Prognostic features including stage, Blackwood score, age, race, and PSA were reviewed.  Treatment options for prostate cancer including active surveillance, hormonal therapy, radiotherapy, and surgery were compared and contrasted with regard to outcome and quality of life. I discussed the radiotherapy options including low-dose rate brachytherapy, high-dose-rate brachytherapy, and external beam radiation therapy. We also discussed the addition of hormone therapy. Side effects and complications of radiation therapy including fatigue, urinary obstructive symptoms, rectal irritation and bleeding, and decline of sexual function were among the complications highlighted. He does have favorable intermediate with less than 50% of his cores positive. He had a single core with primary component Greason 4, and a secondary core with a secondary component of Therese 4, although only 5% of that sample. With his medical comorbidities, I do feel a course that avoids radiation combined with androgen deprivation would be in his best interest.  In that setting, I feel CyberKnife or hypo-fractionated radiation would be appropriate. We also spent a great deal time discussing active surveillance. During our first encounter with specific questioning, he has had somewhat of a health decline over the last 3-5 years and therefore this may very well be a reasonable option. Of course after seeing his intraoral lesion this certainly takes priority. Based on this as well as the multiple issues he has been facing in terms of his functional status, active surveillance is how we elected to proceed or potentially watchful waiting. I will therefore see him back in 6 months and have referred him to Dr. 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 I did mention there may be the possibility of adjuvant radiation with or without chemotherapy depending on the findings at surgery. If this is felt indicated, I would ask Dr. Enedelia Orona to send him back for management. PLAN:   
1) Discussed viable treatment options focusing on external beam radiation highlighting the risks, benefits, and side effects from treatment.  
2) Reviewed available research treatment and cancer care protocols for which patient may be eligible. Unfortunately there are no matching clinical trials available at this time. 3) Incidental finding during workup for prostate cancer of oral cavity cancer which will take part in terms of management. [place] [address] 4) Patient will be scheduled for follow up In 6 months with a PSA or sooner if any radiation is needed for his head and neck cancer. Portions of this note were copied from prior encounters and reviewed for accuracy, currency, and represent documentation and tasks completed during this encounter. I verify and attest these portions to be unchanged from prior visits. Zbigniew Freeman MD 
03/21/19

## 2019-04-18 NOTE — TELEPHONE ENCOUNTER
Pt called earlier in the week. I returned call and left  to call back with plan. I called pt today. He states his call to us was accidental.  He states he has teeth extractions planned for next Wednesday. The plan is for chemo/RT to treat his oral cancer at [facility]. Dr. Ana Jose is his Medical Oncologist.  Dr. Omero Martinez is his ENT. Pt did not recall the name of his Radiation Oncologist at [facility]. He states that his prostate cancer will be followed by Dr. Anselm Galeazzi. I informed pt that I would update Dr. Anselm Galeazzi when he returns next week. Pt is to have Psa and return in 6 months for f/u regarding prostate cancer.      Colette Sheth RN

## 2019-06-13 PROBLEM — C06.9 MOUTH CANCER (HCC): Status: ACTIVE | Noted: 2019-01-01

## 2019-09-16 PROBLEM — C41.0: Status: ACTIVE | Noted: 2019-01-01

## 2019-09-16 PROBLEM — C61 PROSTATE CANCER (HCC): Status: ACTIVE | Noted: 2019-01-01

## 2019-09-26 NOTE — PROGRESS NOTES
6 Month Follow Up 
 
LABS:  
09/19/2019 - PSA: 17.7  
11/08/2018 - PSA: 11.9 
 
12/09/2019 - Device Check - Pacer Erbitux End - 07/17/2019 Chemo / RT / Oral Cancer Dr. Halima Alcocer @ [facility] Dr. Felisa Palacios - ENT Patient is currently taking Xarelto. AUA/Epic: 4, mostly dissatisfied Billy Becerra, CMA

## 2019-09-26 NOTE — PROGRESS NOTES
Patient: Maria G Young MRN: [id number]  SSN: [id number] YOB: 1945  Age: 69 y.o. Sex: male Other Providers:  Charlie Sanchez MD, Nahid Marks MD (Rad/Onc), Vincent Fuentes MD (Med/Onc) CHIEF COMPLAINT: Prostate Cancer DIAGNOSIS: Favorable intermediate risk prostate adenocarcinoma. GS 4+3=7, PSA 11.9 (Density 0.37) PREVIOUS TREATMENT: 
1)  TRUS Biopsy 1/16/19 HISTORY OF PRESENT ILLNESS:  Maria G Young is a 69 y.o. male who I am seeing at the request of Dr. Alberto Wilcox for prostate cancer back after original consultation 2/22/19. He was seen and evaluated for an elevated PSA on routine screening. His pertinent past medical history includes obesity , asthma, A-Fib, DMII, HTN, depression, PUD, and sleep apnea. He does have a pacemaker. With his spinal stenosis and arthritis, he is non-ambulatory with an electric wheel chair. There was no family history of prostate cancer. PSA History: PSA 11.9 November 2018 PSA 7.1 June 2016 PSA 5.8 October 2015 PSA 5.3 March 2015 PSA 17.7 9/19/19 Biopsy Results:  1/16/19 - 32 cc gland. 3 cores total.   
[place] 4+3 <5% right apex. [place] 3+4 equal 7 disease with 50% involvement of one out of 3 cores left apex (<5% GS 4). [place] 6 disease is noted in 2 out of 3 cores from the left mid with 50 and 5% involvement respectively. High-grade PIM is noted at the left base. He was ultimately seen back in the urology office to discuss the results of the biopsy and management options. Both radiation and surgical options were discussed and he has been referred to me to further discuss radiation as an option for management of his prostate cancer. Per Dr. Alberto Wilcox, he did not feel he was an ideal surgical candidate but did feel radiation would be a good option for him.   After original discussion we did determine that his functional status had seen somewhat declining over recent years. Nonetheless we did proceed with a CT and bone scan which noted no concern for extracapsular spread or distant metastatic disease. He was presented in conference 3/19/2019 and a group agreed with either active surveillance or potentially CyberKnife as reasonable options for management of his disease. Unfortunately between our encounters his dentist saw him and biopsied an area that he called buccal mucosa which returned positive for squamous cell carcinoma. This was evident on exam during his follow-up 3/21/2019. As such he was referred for surgical management. He then went on to complete definitive radiation with concurrent Cetuximab 7/17/19 with Dr. Cortney Jose at [facility] considering surgery was felt to be high risk for him. 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 He suffered oral pain and odynophagia as would be expected. He was being maintained on a feeding tube with regular follow-up by his medical oncologist and radiation oncologist. Overall his health was worse than was seen previously as would be expected with the treatment he endured. GENITOURINARY REVIEW OF SYSTEMS:  An EPIC 26 was completed pretreatment. His AUA score was 4. PAST MEDICAL HISTORY:   
Past Medical History:  
Diagnosis Date  Arrhythmia   
 afib  Arthritis  Asthma 2/4/2015  Atrial fibrillation ([address]  Atrial flutter ([address] 12/11/2015  Backache, unspecified 11/12/2014  Chest pain 04/2009  Chronic pain SPINAL STENOSIS  Chronic pain LEFT SHOULDER TENDONITIS  Chronic pain   
 neck pain  Costochondritis 12/11/2015  Diabetes ([address] TYPE 2  
 Edema 11/12/2014  Encounter for long-term (current) use of other medications 11/12/2014  Hypertension 04/2009  
 essential  
 Morbid obesity ([address]  OA (osteoarthritis)  Other ill-defined conditions(799.89) MORBID OBESITY  Other ill-defined conditions(799.89)   
 sleep apnea  Pacemaker  Personal history of fall 11/12/2014  Psychiatric disorder   
 depression  PUD (peptic ulcer disease)   
 hx ulcer in 1983  Sleep apnea 12/11/2015  Type II or unspecified type diabetes mellitus without mention of complication, not stated as uncontrolled 11/12/2014  Unspecified sleep apnea   
 cpap PAST SURGICAL HISTORY:  
Past Surgical History:  
Procedure Laterality Date  HX PACEMAKER  Aug 2015  
 OTHER CELL    
 REMOVAL GANGLION CYST FROM LEFT FOOT  
 OTHER CELL    
 wisdom teeth removal  
 
 
MEDICATIONS:  
 
Current Outpatient Medications:  
  erythromycin (ILOTYCIN) ophthalmic ointment, Small amount to right eyelid tid x 5 days. , Disp: 3.5 g, Rfl: 5 
  butalbital-acetaminophen-caffeine (FIORICET, ESGIC) -40 mg per tablet, TAKE 2 TABLETS BY MOUTH EVERY 4 HOURS, Disp: 180 Tab, Rfl: 1 
  albuterol (PROAIR HFA) 90 mcg/actuation inhaler, Take 2 Puffs by inhalation every four (4) hours as needed for Wheezing., Disp: 1 Inhaler, Rfl: 5 
  fentaNYL (DURAGESIC) 12 mcg/hr patch, 1 Patch by TransDERmal route every seventy-two (72) hours. , Disp: , Rfl:  
  oxyCODONE IR (OXY-IR) 15 mg immediate release tablet, Take 30 mg by mouth every four (4) hours as needed. , Disp: , Rfl: 0 
  ondansetron hcl (ZOFRAN) 8 mg tablet, Take 8 mg by mouth every eight (8) hours as needed. , Disp: , Rfl:  
  traMADol (ULTRAM) 50 mg tablet, Take 50 mg by mouth every six (6) hours as needed. , Disp: , Rfl:  
  HYDROcodone-acetaminophen (NORCO) 7.5-325 mg per tablet, Take 1 Tab by mouth every eight (8) hours as needed for Pain., Disp: , Rfl:  
  gabapentin (NEURONTIN) 600 mg tablet, TAKE ONE TABLET BY MOUTH THREE TIMES DAILY, Disp: 270 Tab, Rfl: 2 
  DULoxetine (CYMBALTA) 60 mg capsule, TAKE 1 CAPSULE BY MOUTH ONCE DAILY, Disp: 90 Cap, Rfl: 3 
  allopurinol (ZYLOPRIM) 300 mg tablet, TAKE 1 TABLET BY MOUTH ONCE DAILY, Disp: 90 Tab, Rfl: 2   carvedilol (COREG) 6.25 mg tablet, Take 1 Tab by mouth two (2) times daily (with meals). , Disp: 180 Tab, Rfl: 3 
  montelukast (SINGULAIR) 10 mg tablet, TAKE 1 TABLET BY MOUTH ONCE DAILY, Disp: 90 Tab, Rfl: 2 
  nabumetone (RELAFEN) 750 mg tablet, Take 1 Tab by mouth two (2) times a day., Disp: 180 Tab, Rfl: 3 
  folic acid (FOLVITE) 1 mg tablet, Take 1 Tab by mouth daily. , Disp: 90 Tab, Rfl: 2 
  nystatin (MYCOSTATIN) 100,000 unit/mL suspension, SWISH AND SPIT ONE TEASPOONFUL (5ML) BY MOUTH 4 TIMES DAILY, Disp: 120 mL, Rfl: 5 
  diclofenac (VOLTAREN) 1 % gel, APPLY 4 GRAMS TOPICALLY TO AFFECTED 4 TIMES DAILY, Disp: 100 g, Rfl: 8 
  rivaroxaban (XARELTO) 20 mg tab tablet, Take 1 Tab by mouth daily. , Disp: 90 Tab, Rfl: 3 
  fluticasone (FLONASE) 50 mcg/actuation nasal spray, One spray in each nostril twice daily, Disp: 1 Bottle, Rfl: 11 
  cpap machine kit, by Does Not Apply route., Disp: , Rfl:  
  polyethylene glycol (MIRALAX) 17 gram packet, Take 17 g by mouth as needed. , Disp: , Rfl: ALLERGIES:  
Allergies Allergen Reactions  Metronidazole Hives SOCIAL HISTORY:  
Social History Socioeconomic History  Marital status:  Spouse name: Not on file  Number of children: Not on file  Years of education: Not on file  Highest education level: Not on file Occupational History  Not on file Social Needs  Financial resource strain: Not on file  Food insecurity:  
  Worry: Not on file Inability: Not on file  Transportation needs:  
  Medical: Not on file Non-medical: Not on file Tobacco Use  Smoking status: Never Smoker  Smokeless tobacco: Never Used Substance and Sexual Activity  Alcohol use: No  
  Alcohol/week: 0.0 standard drinks  Drug use: No  
 Sexual activity: Not on file Lifestyle  Physical activity:  
  Days per week: Not on file Minutes per session: Not on file  Stress: Not on file Relationships  Social connections:  
  Talks on phone: Not on file Gets together: Not on file Attends [employer] service: Not on file Active member of club or organization: Not on file Attends meetings of clubs or organizations: Not on file Relationship status: Not on file  Intimate partner violence:  
  Fear of current or ex partner: Not on file Emotionally abused: Not on file Physically abused: Not on file Forced sexual activity: Not on file Other Topics Concern  Not on file Social History Narrative  Not on file FAMILY HISTORY:  
Family History Problem Relation Age of Onset  Heart Failure Mother   
     xh chf  
 
 
PHYSICAL EXAMINATION:  
ECOG Performance status 3 VITAL SIGNS:  
Visit Vitals /68 (BP 1 Location: Left arm, BP Patient Position: Sitting) Pulse 65 Temp 98.5 °F (36.9 °C) Resp 18 Wt 139.2 kg (306 lb 12.8 oz) SpO2 95% BMI 41.61 kg/m² GENERAL: The patient is obese, ambulatory, alert and in no acute distress. HEENT: Head is normocephalic, atraumatic. Pupils are equal, round and reactive to light and accommodation. Extraocular movement intact. Hearing is intact bilaterally to finger rub. Oral cavity reveals erythematous changes from prior radiation with dry mucous membranes and excoriation on left lateral tongue. NECK: Neck is supple with no masses. CARDIOVASCULAR: Heart is regular rate and rhythm. There are no murmurs rubs or gallups. Radial pulses are 2+ RESPIRATORY: Lungs are clear to auscultation and percussion. There is normal respiratory effort. GASTROINTESTINAL: The abdomen is soft, non-tender, nondistended with no hepatospelnomagaly. Feeding tube in place. Digital rectal examination: deferred PATHOLOGY:   
Please see HPI for details of TRUS Biopsy. 1/16/19:   
 DIAGNOSIS  
A: \"PROSTATE, LEFT BASE, BIOPSY\":  
HIGH GRADE PROSTATIC INTRAEPITHELIAL NEOPLASIA. B: \"PROSTATE, LEFT MID, BIOPSY\":  
 HIGH GRADE PROSTATIC INTRAEPITHELIAL NEOPLASIA WITH ADJACENT ATYPICAL GLANDS SUSPICIOUS FOR ADENOCARCINOMA INVOLVING 1 OF 3 CORES. PROSTATIC ADENOCARCINOMA, RUPETR SCORE 3 + 3 = 6 (GRADE GROUP 1), INVOLVING 2 OF 3 CORES (DISCONTINUOUSLY INVOLVING 50%; LESS THAN 5%). C: \"PROSTATE, LEFT APEX, BIOPSY\":  
PROSTATIC ADENOCARCINOMA, RUPERT SCORE 3 + 4 = 7 (GRADE GROUP 2), INVOLVING 50% OF 1 OF 3 CORES; PERCENTAGE OF GRADE 4 LESS THAN  
5%. PROSTATIC ADENOCARCINOMA, RUPERT SCORE 3 + 3 = 6 (GRADE GROUP 1), INVOLVING 10% OF 1 OF 3 CORES. D: \"PROSTATE, RIGHT BASE, BIOPSY\":  
NO CARCINOMA IDENTIFIED. E: \"PROSTATE, RIGHT MID, BIOPSY\":  
NO CARCINOMA IDENTIFIED. F: \"PROSTATE, RIGHT APEX, BIOPSY\":  
PROSTATIC ADENOCARCINOMA, RUPERT SCORE 4 + 3 = 7 (GRADE GROUP 3), INVOLVING LESS THAN 5% OF MULTIPLE FRAGMENTS. LABORATORY:  
Lab Results Component Value Date/Time Sodium 138 05/09/2019 01:57 PM  
 Potassium 4.3 05/09/2019 01:57 PM  
 Chloride 98 05/09/2019 01:57 PM  
 CO2 25 05/09/2019 01:57 PM  
 Anion gap 6 (L) 05/10/2016 01:02 PM  
 Glucose 116 (H) 05/09/2019 01:57 PM  
 BUN 11 05/09/2019 01:57 PM  
 Creatinine 0.94 05/09/2019 01:57 PM  
 GFR est AA 93 05/09/2019 01:57 PM  
 GFR est non-AA 80 05/09/2019 01:57 PM  
 Calcium 9.4 05/09/2019 01:57 PM  
 Magnesium 1.5 (L) 07/14/2015 11:33 AM  
 Albumin 4.0 05/09/2019 01:57 PM  
 Protein, total 7.0 05/09/2019 01:57 PM  
 Globulin 4.2 (H) 05/10/2016 01:02 PM  
 A-G Ratio 1.3 05/09/2019 01:57 PM  
 AST (SGOT) 11 05/09/2019 01:57 PM  
 ALT (SGPT) 10 05/09/2019 01:57 PM  
 
Lab Results Component Value Date/Time WBC 8.8 05/09/2019 01:57 PM  
 HGB 16.5 05/09/2019 01:57 PM  
 HCT 48.7 05/09/2019 01:57 PM  
 PLATELET 009 68/20/5121 01:57 PM  
 
 
Please see HPI for PSA History. RADIOLOGY:   
I have personally reviewed the imaging and agree with the reports below. Nm Bone Scan Wh Body Result Date: 3/15/2019 Nuclear medicine whole body bone scan. CLINICAL INDICATION: Prostate cancer, staging exam PROCEDURE: After appropriate patient preparation, the patient was administered 25.4 mCi of technetium labeled HDP. Standard scintigraphic imaging was performed over the entire skeleton. COMPARISON: CT the abdomen and pelvis with contrast from the same day FINDINGS: There is increased tracer uptake noted along the posterior elements at T11 and L2 which is likely degenerative. Degenerative uptake also noted in the bilateral, left greater than right knees. The somewhat usual appearance of the left kidney is related to the very large renal cyst.  
 
IMPRESSION: 1. No scintigraphic evidence for osseous metastatic disease. 2. Degenerative uptake noted at T11 and L3 3. Large left renal cyst 
 
Ct Abd Pelv W Cont Result Date: 3/15/2019 CT OF THE ABDOMEN AND PELVIS WITH CONTRAST. CLINICAL INDICATION: Prostate cancer, staging exam PROCEDURE: Serial thin section axial images obtained from the lung bases through the proximal femurs following the administration of 100 cc of Isovue 370 intravenous contrast.  Radiation dose reduction techniques were used for this study. Our CT scanners use one or all of the following: Automated exposure control, adjusted of the mA and/or kV according to patient size, iterative reconstruction COMPARISON: CT the abdomen and pelvis dated 8/11/2016 the lung bases are clear. FINDINGS: CT ABDOMEN: The liver and spleen are unremarkable. The patient is status post cholecystectomy. The pancreas is normal. The kidneys are symmetric in size. Multiple bilateral renal cysts are appreciated. The largest is noted on the left measuring 10 cm. There is no hydronephrosis. The adrenal glands are normal. No retroperitoneal or mesenteric adenopathy.  In the midline, there is suggestion of a trocar site from prior abdominal procedure with underlying nodular soft tissue density that has decreased slightly in size along the anterior lower midline abdominal wall most likely representing hypertrophic scarring. It currently measures 4.1 x 2.0 cm. (Previous 4.5 x 1.9 cm). CT PELVIS: The bladder is well-distended with smooth thin walls. No inguinal hernia or adenopathy. The prostate gland has a normal appearance. No free fluid appreciated dependently in the pelvis. No aggressive osseous lesions identified. IMPRESSION: 1. No acute abdominal abnormality or findings to suspect metastatic disease. 2. Nodular soft tissue density along the anterior lower midline abdominal wall with a contiguous area of linear scarring most in keeping with hypertrophic scar tissue. 3. Bilateral numerous renal cysts. IMPRESSION:  Renato Mcdaniel is a 69 y.o. male with favorable intermediate risk prostate cancer. The natural history of prostate cancer was again reviewed with the patient. Prognostic features including stage, [place] score, age, race, and PSA were reviewed. Treatment options for prostate cancer including active surveillance, hormonal therapy, radiotherapy, and surgery were compared and contrasted with regard to outcome and quality of life. I discussed the radiotherapy options including low-dose rate brachytherapy, high-dose-rate brachytherapy, and external beam radiation therapy. We also discussed the addition of hormone therapy. Side effects and complications of radiation therapy including fatigue, urinary obstructive symptoms, rectal irritation and bleeding, and decline of sexual function were among the complications highlighted. He does have favorable intermediate with less than 50% of his cores positive. He had a single core with primary component [place] 4, and a secondary core with a secondary component of [place] 4, although only 5% of that sample.   With his medical comorbidities, I do feel a course that avoids radiation combined with androgen deprivation would be in his best interest.  In that setting, I feel CyberKnife or hypo-fractionated radiation would be appropriate. We also spent a great deal time discussing active surveillance. During our first encounter with specific questioning, he has had somewhat of a health decline over the last 3-5 years and therefore this may very well be a reasonable option. Of course after seeing his intraoral lesion this certainly took priority. Based on this as well as the multiple issues he has been facing in terms of his functional status, active surveillance is how we elected to proceed or potentially watchful waiting. He did proceed with management to his oral cavity cancer which was prudent and continues to suffer from the ramifications of a concurrent chemoradiation course. His PSA did elevate to near 18 but again I'm fearful of his overall health decline and would simply plan to see him back in 3 months with another PSA. If he is doing fair at that time, we would consider radiation or restaging if his PSA had advanced even further. PLAN:   
1) Discussed viable treatment options focusing on external beam radiation highlighting the risks, benefits, and side effects from treatment. 2) Reviewed available research treatment and cancer care protocols for which patient may be eligible. Unfortunately there are no matching clinical trials available at this time. 3) Incidental finding during workup for prostate cancer of oral cavity cancer with subsequent definitive radiation and Cetuximab. 4) Patient will be scheduled for follow up in 3 months with a PSA or defer if he is declining and wishes to avoid further checks. Portions of this note were copied from prior encounters and reviewed for accuracy, currency, and represent documentation and tasks completed during this encounter. I verify and attest these portions to be unchanged from prior visits. Rosanne Osgood, MD 
09/26/19

## 2020-01-03 ENCOUNTER — APPOINTMENT (OUTPATIENT)
Dept: RADIATION ONCOLOGY | Age: 75
End: 2020-01-03

## 2020-01-09 ENCOUNTER — APPOINTMENT (OUTPATIENT)
Dept: RADIATION ONCOLOGY | Age: 75
End: 2020-01-09